# Patient Record
Sex: FEMALE | Race: WHITE | Employment: UNEMPLOYED | URBAN - METROPOLITAN AREA
[De-identification: names, ages, dates, MRNs, and addresses within clinical notes are randomized per-mention and may not be internally consistent; named-entity substitution may affect disease eponyms.]

---

## 2017-03-15 ENCOUNTER — OFFICE VISIT (OUTPATIENT)
Dept: FAMILY MEDICINE CLINIC | Facility: CLINIC | Age: 47
End: 2017-03-15

## 2017-03-15 VITALS
HEART RATE: 92 BPM | DIASTOLIC BLOOD PRESSURE: 78 MMHG | TEMPERATURE: 98 F | RESPIRATION RATE: 12 BRPM | SYSTOLIC BLOOD PRESSURE: 110 MMHG | WEIGHT: 115 LBS | HEIGHT: 60 IN | BODY MASS INDEX: 22.58 KG/M2 | OXYGEN SATURATION: 98 %

## 2017-03-15 DIAGNOSIS — J01.00 ACUTE NON-RECURRENT MAXILLARY SINUSITIS: Primary | ICD-10-CM

## 2017-03-15 DIAGNOSIS — J20.9 ACUTE BRONCHITIS, UNSPECIFIED ORGANISM: ICD-10-CM

## 2017-03-15 DIAGNOSIS — R07.89 CHEST TIGHTNESS: ICD-10-CM

## 2017-03-15 PROCEDURE — 99213 OFFICE O/P EST LOW 20 MIN: CPT | Performed by: PHYSICIAN ASSISTANT

## 2017-03-15 RX ORDER — ALBUTEROL SULFATE 90 UG/1
2 AEROSOL, METERED RESPIRATORY (INHALATION) EVERY 4 HOURS PRN
Qty: 1 INHALER | Refills: 0 | Status: SHIPPED | OUTPATIENT
Start: 2017-03-15 | End: 2018-09-18 | Stop reason: ALTCHOICE

## 2017-03-15 RX ORDER — CODEINE PHOSPHATE AND GUAIFENESIN 10; 100 MG/5ML; MG/5ML
5 SOLUTION ORAL EVERY 6 HOURS PRN
Qty: 120 ML | Refills: 0 | Status: SHIPPED | OUTPATIENT
Start: 2017-03-15 | End: 2017-08-14

## 2017-03-15 RX ORDER — LEVOFLOXACIN 500 MG/1
500 TABLET, FILM COATED ORAL DAILY
Qty: 7 TABLET | Refills: 0 | Status: SHIPPED | OUTPATIENT
Start: 2017-03-15 | End: 2017-03-22

## 2017-03-15 NOTE — PATIENT INSTRUCTIONS
Please follow up with your PCP if no improvement within 5-7 days. Go directly to the ER for any acute worsening of symptoms. No heavy lifting while taking Levaquin  · Rest.  Drink lots of fluids.   · Use the inhaler as needed for cough or wheeze  · Mucine susceptible to sunburn. Please wear sunscreen and apply often; also wear a hat. Take with food.   · Use back up birth control for pregnancy prevention for 1 month due to antibiotic use

## 2017-03-15 NOTE — PROGRESS NOTES
Patient presents with:  Cough: ST, chest congestion, sneezing, CP and SOB due to cough. Tan colored mucous off and on for about a month     HPI:   Jennifer Rasmussen is a 55year old female who presents for sinus congestion and cough on and off for  1  months. No exudates. NECK: supple, non-tender  LUNGS: Normal respiratory rate. Normal effort. Dry cough. No wheezing. No rhonchi, rales,or crackles. + moderately decreased BS to BLADE.    CARDIO: RRR without murmur  EXTREMITIES: no cyanosis, clubbing or edema  LYM within 48-72 hours  · Go to ER if facial or periorbital swelling develops  · Please take all of your antibiotic as prescribed or the infection will be treated ineffectively and may return  · Humidify the air.   Steam inhalation and warm compresses often hel

## 2017-04-07 ENCOUNTER — TELEPHONE (OUTPATIENT)
Dept: FAMILY MEDICINE CLINIC | Facility: CLINIC | Age: 47
End: 2017-04-07

## 2017-05-08 ENCOUNTER — OFFICE VISIT (OUTPATIENT)
Dept: FAMILY MEDICINE CLINIC | Facility: CLINIC | Age: 47
End: 2017-05-08

## 2017-05-08 VITALS
DIASTOLIC BLOOD PRESSURE: 60 MMHG | RESPIRATION RATE: 18 BRPM | TEMPERATURE: 98 F | SYSTOLIC BLOOD PRESSURE: 110 MMHG | HEART RATE: 78 BPM | OXYGEN SATURATION: 98 %

## 2017-05-08 DIAGNOSIS — M54.6 ACUTE LEFT-SIDED THORACIC BACK PAIN: Primary | ICD-10-CM

## 2017-05-08 DIAGNOSIS — J01.00 ACUTE MAXILLARY SINUSITIS, RECURRENCE NOT SPECIFIED: ICD-10-CM

## 2017-05-08 PROCEDURE — 81003 URINALYSIS AUTO W/O SCOPE: CPT | Performed by: NURSE PRACTITIONER

## 2017-05-08 PROCEDURE — 99213 OFFICE O/P EST LOW 20 MIN: CPT | Performed by: NURSE PRACTITIONER

## 2017-05-08 PROCEDURE — 87086 URINE CULTURE/COLONY COUNT: CPT | Performed by: NURSE PRACTITIONER

## 2017-05-08 RX ORDER — AMOXICILLIN AND CLAVULANATE POTASSIUM 875; 125 MG/1; MG/1
1 TABLET, FILM COATED ORAL 2 TIMES DAILY
Qty: 20 TABLET | Refills: 0 | Status: SHIPPED | OUTPATIENT
Start: 2017-05-08 | End: 2017-05-18

## 2017-05-08 NOTE — PROGRESS NOTES
CHIEF COMPLAINT:   Patient presents with:  URI: x1 week, but on and off for months      HPI:   Umer Ragsdale is a 55year old female who presents for sinus congestion for and and off for several months.   Reports last time here given levaquin, took that and Diagnosis Date   • Fibromyalgia    • Migraines           Past Surgical History    CHOLECYSTECTOMY      APPENDECTOMY      OTHER SURGICAL HISTORY      Comment lysis of adhesions x 3     HYSTERECTOMY      Comment Partial      Family History   Problem Relation ABD/Back:.  Mild pain with palpation to left flank area/CVAT. No abd tenderness. No bladder distention or pressure. EXTREMITIES: no cyanosis, clubbing or edema  LYMPH:  No cervical lymphadenopathy.         ASSESSMENT AND PLAN:     Bijan Mcintosh is a 55 ye The sinuses are air-filled spaces within the bones of the face. They connect to the inside of the nose. Sinusitis is an inflammation of the tissue lining the sinus cavity.  Sinus inflammation can occur during a cold. It can also be due to allergies to polle · Use acetaminophen or ibuprofen to control pain, unless another pain medicine was prescribed. (If you have chronic liver or kidney disease or ever had a stomach ulcer, talk with your doctor before using these medicines.  Aspirin should never be used in any

## 2017-05-10 ENCOUNTER — OFFICE VISIT (OUTPATIENT)
Dept: FAMILY MEDICINE CLINIC | Facility: CLINIC | Age: 47
End: 2017-05-10

## 2017-05-10 VITALS
OXYGEN SATURATION: 98 % | HEART RATE: 104 BPM | BODY MASS INDEX: 22 KG/M2 | WEIGHT: 115 LBS | RESPIRATION RATE: 14 BRPM | DIASTOLIC BLOOD PRESSURE: 80 MMHG | SYSTOLIC BLOOD PRESSURE: 120 MMHG | TEMPERATURE: 98 F

## 2017-05-10 DIAGNOSIS — R21 RASH: Primary | ICD-10-CM

## 2017-05-10 PROCEDURE — 87147 CULTURE TYPE IMMUNOLOGIC: CPT | Performed by: PHYSICIAN ASSISTANT

## 2017-05-10 PROCEDURE — 87880 STREP A ASSAY W/OPTIC: CPT | Performed by: PHYSICIAN ASSISTANT

## 2017-05-10 PROCEDURE — 87081 CULTURE SCREEN ONLY: CPT | Performed by: PHYSICIAN ASSISTANT

## 2017-05-10 PROCEDURE — 99213 OFFICE O/P EST LOW 20 MIN: CPT | Performed by: PHYSICIAN ASSISTANT

## 2017-05-10 RX ORDER — PREDNISONE 20 MG/1
40 TABLET ORAL DAILY
Qty: 10 TABLET | Refills: 0 | Status: SHIPPED | OUTPATIENT
Start: 2017-05-10 | End: 2017-05-15

## 2017-05-10 NOTE — PROGRESS NOTES
CHIEF COMPLAINT:   Patient presents with:  Rash     HPI:   Moni Stockton is a 55year old female who presents for evaluation of a rash. Per patient rash started last night. Rash has been worsening since onset.   Patient has not had similar rash in the past Diagnosis Date   • Fibromyalgia    • Migraines           Past Surgical History    CHOLECYSTECTOMY      APPENDECTOMY      OTHER SURGICAL HISTORY      Comment lysis of adhesions x 3     HYSTERECTOMY      Comment Partial      Family History   Problem Relati No erythema of the throat. Tonsils 1+/4 bilaterally and without exudate. Oropharynx moist without lesions. NECK:  Supple. Non tender  LUNGS: Clear to auscultation bilaterally. No wheezing, rhonchi, or rales. No diminished breath sounds.  No increased wor hours. If positive, then we will call in an appropriate antibiotic. If negative, then the sore throat is most likely viral in origin and should resolve within 7-10 days. Take cool baths as needed for itching.   Oatmeal baths (Aveeno bath) and Calamine lot

## 2017-05-10 NOTE — PATIENT INSTRUCTIONS
We will send out a throat culture and will contact you with the results in 48-72 hours. If positive, then we will call in an appropriate antibiotic. If negative, then the sore throat is most likely viral in origin and should resolve within 7-10 days.    [facility]

## 2017-05-13 ENCOUNTER — TELEPHONE (OUTPATIENT)
Dept: FAMILY MEDICINE CLINIC | Facility: CLINIC | Age: 47
End: 2017-05-13

## 2017-05-16 ENCOUNTER — TELEPHONE (OUTPATIENT)
Dept: FAMILY MEDICINE CLINIC | Facility: CLINIC | Age: 47
End: 2017-05-16

## 2017-06-19 ENCOUNTER — TELEPHONE (OUTPATIENT)
Dept: FAMILY MEDICINE CLINIC | Facility: CLINIC | Age: 47
End: 2017-06-19

## 2017-08-14 ENCOUNTER — HOSPITAL ENCOUNTER (OUTPATIENT)
Age: 47
Discharge: HOME | End: 2017-08-14
Attending: EMERGENCY MEDICINE

## 2017-08-14 ENCOUNTER — APPOINTMENT (OUTPATIENT)
Dept: CT IMAGING | Age: 47
End: 2017-08-14
Attending: EMERGENCY MEDICINE

## 2017-08-14 ENCOUNTER — APPOINTMENT (OUTPATIENT)
Dept: GENERAL RADIOLOGY | Age: 47
End: 2017-08-14
Attending: EMERGENCY MEDICINE

## 2017-08-14 VITALS
DIASTOLIC BLOOD PRESSURE: 76 MMHG | RESPIRATION RATE: 16 BRPM | HEART RATE: 87 BPM | TEMPERATURE: 98 F | WEIGHT: 119 LBS | OXYGEN SATURATION: 98 % | BODY MASS INDEX: 23 KG/M2 | SYSTOLIC BLOOD PRESSURE: 107 MMHG

## 2017-08-14 DIAGNOSIS — R05.9 COUGH: Primary | ICD-10-CM

## 2017-08-14 DIAGNOSIS — N83.209 OVARIAN CYST: ICD-10-CM

## 2017-08-14 DIAGNOSIS — R10.9 FLANK PAIN: ICD-10-CM

## 2017-08-14 DIAGNOSIS — R09.81 NASAL CONGESTION: ICD-10-CM

## 2017-08-14 LAB
#LYMPHOCYTE IC: 3.2 X10ˆ3/UL (ref 0.9–3.2)
#MXD IC: 1.3 X10ˆ3/UL (ref 0.1–1)
#NEUTROPHIL IC: 7.8 X10ˆ3/UL (ref 1.3–6.7)
CREAT SERPL-MCNC: 1 MG/DL (ref 0.4–1)
GLUCOSE BLD-MCNC: 55 MG/DL (ref 65–99)
HCT IC: 42.7 % (ref 37–54)
HGB IC: 14.9 G/DL (ref 11.7–16)
ISTAT BLOOD GAS TCO2: 25 MMOL/L (ref 22–32)
ISTAT BUN: 15 MG/DL (ref 8–20)
ISTAT CHLORIDE: 103 MMOL/L (ref 101–111)
ISTAT HEMATOCRIT: 44 % (ref 37–54)
ISTAT IONIZED CALCIUM: 1.14 MMOL/L (ref 1.12–1.32)
ISTAT POTASSIUM: 3.9 MMOL/L (ref 3.6–5.1)
ISTAT SODIUM: 141 MMOL/L (ref 136–144)
ISTAT TROPONIN: <0.1 NG/ML (ref ?–0.1)
LYMPHOCYTES NFR BLD AUTO: 26.1 %
MCH IC: 30 PG (ref 27–33.2)
MCHC IC: 34.9 G/DL (ref 31–37)
MCV IC: 85.9 FL (ref 81–100)
MIXED CELL %: 10.3 %
NEUTROPHILS NFR BLD AUTO: 63.6 %
PLT IC: 219 X10ˆ3/UL (ref 150–450)
POCT BILIRUBIN URINE: NEGATIVE
POCT BLOOD URINE: NEGATIVE
POCT GLUCOSE URINE: NEGATIVE MG/DL
POCT KETONE URINE: NEGATIVE MG/DL
POCT LEUKOCYTE ESTERASE URINE: NEGATIVE
POCT NITRITE URINE: NEGATIVE
POCT PH URINE: 7 (ref 5–8)
POCT SPECIFIC GRAVITY URINE: 1.02
POCT UROBILINOGEN URINE: 2 MG/DL
RBC IC: 4.97 X10ˆ6/UL (ref 3.8–5.1)
WBC IC: 12.3 X10ˆ3/UL (ref 4–13)

## 2017-08-14 PROCEDURE — 85025 COMPLETE CBC W/AUTO DIFF WBC: CPT | Performed by: EMERGENCY MEDICINE

## 2017-08-14 PROCEDURE — 81002 URINALYSIS NONAUTO W/O SCOPE: CPT | Performed by: EMERGENCY MEDICINE

## 2017-08-14 PROCEDURE — 99215 OFFICE O/P EST HI 40 MIN: CPT

## 2017-08-14 PROCEDURE — 93005 ELECTROCARDIOGRAM TRACING: CPT

## 2017-08-14 PROCEDURE — 93010 ELECTROCARDIOGRAM REPORT: CPT

## 2017-08-14 PROCEDURE — 87086 URINE CULTURE/COLONY COUNT: CPT | Performed by: EMERGENCY MEDICINE

## 2017-08-14 PROCEDURE — 84484 ASSAY OF TROPONIN QUANT: CPT

## 2017-08-14 PROCEDURE — 80047 BASIC METABLC PNL IONIZED CA: CPT

## 2017-08-14 PROCEDURE — 71020 XR CHEST PA + LAT CHEST (CPT=71020): CPT | Performed by: EMERGENCY MEDICINE

## 2017-08-14 PROCEDURE — 74176 CT ABD & PELVIS W/O CONTRAST: CPT | Performed by: EMERGENCY MEDICINE

## 2017-08-14 RX ORDER — ALBUTEROL SULFATE 90 UG/1
2 AEROSOL, METERED RESPIRATORY (INHALATION) EVERY 4 HOURS PRN
Qty: 1 INHALER | Refills: 0 | Status: SHIPPED | OUTPATIENT
Start: 2017-08-14 | End: 2017-09-13

## 2017-08-14 RX ORDER — PREDNISONE 20 MG/1
40 TABLET ORAL DAILY
Qty: 10 TABLET | Refills: 0 | Status: SHIPPED | OUTPATIENT
Start: 2017-08-14 | End: 2017-08-19

## 2017-08-14 RX ORDER — CEFDINIR 300 MG/1
300 CAPSULE ORAL 2 TIMES DAILY
Qty: 20 CAPSULE | Refills: 0 | Status: SHIPPED | OUTPATIENT
Start: 2017-08-14 | End: 2017-08-24

## 2017-08-14 NOTE — ED NOTES
Pt back from Xray and CT with Tech, repositioned for comfort, denies any needs, pain 5/10. Labs obtained.

## 2017-08-14 NOTE — ED INITIAL ASSESSMENT (HPI)
X2 MONTHS Pt c/o cough and congestion, Saturday Pt started with a fever (Tmax 99.9)    Saturday Pt c/o freq with urination, +cinthia flank pain.

## 2017-08-15 LAB
ATRIAL RATE: 75 BPM
P AXIS: 60 DEGREES
P-R INTERVAL: 118 MS
Q-T INTERVAL: 394 MS
QRS DURATION: 78 MS
QTC CALCULATION (BEZET): 439 MS
R AXIS: 46 DEGREES
T AXIS: 47 DEGREES
VENTRICULAR RATE: 75 BPM

## 2017-08-15 NOTE — ED PROVIDER NOTES
Patient presents with:  Cough/URI  Fever (infectious)  Urinary Symptoms (urologic)    HPI:     Delvin Jamison is a 55year old female who presents with chief complaint of cough, congestion, flank pain.   Pt states she has a hx of fibromyalgia and frequent py rashes, lesions or abrasions. Diagnostics:     EKG Interpretation    Rate, axis and intervals noted. Rate:  75  Rhythm: Normal sinus rhythm  No acute changes noted.      Labs Reviewed   POCT URINALYSIS DIPSTICK - Abnormal; Notable for the following: information is transmitted to the A[facility] ([facility]) NRDR ([address]) which includes the Dose Index Registry.   PATIENT STATED HISTORY: (As transcribed by Technologist)  Constant bilateral posterior upper abdominal pa NSAID/APAP, antihistamine, flonase  3. F/u with PCP in 3-4 days for re-evaluation, sooner if symptoms worsen      All results reviewed and discussed with patient. See AVS for detailed discharge instructions.

## 2017-08-21 ENCOUNTER — OFFICE VISIT (OUTPATIENT)
Dept: FAMILY MEDICINE CLINIC | Facility: CLINIC | Age: 47
End: 2017-08-21

## 2017-08-21 ENCOUNTER — TELEPHONE (OUTPATIENT)
Dept: FAMILY MEDICINE CLINIC | Facility: CLINIC | Age: 47
End: 2017-08-21

## 2017-08-21 VITALS
TEMPERATURE: 98 F | BODY MASS INDEX: 24 KG/M2 | SYSTOLIC BLOOD PRESSURE: 108 MMHG | WEIGHT: 123 LBS | RESPIRATION RATE: 22 BRPM | DIASTOLIC BLOOD PRESSURE: 72 MMHG | HEART RATE: 88 BPM

## 2017-08-21 DIAGNOSIS — N83.201 OVARIAN CYST, RIGHT: Primary | ICD-10-CM

## 2017-08-21 DIAGNOSIS — K66.0 INTRA-ABDOMINAL ADHESIONS: ICD-10-CM

## 2017-08-21 DIAGNOSIS — R10.2 PELVIC PAIN: ICD-10-CM

## 2017-08-21 PROCEDURE — 99214 OFFICE O/P EST MOD 30 MIN: CPT | Performed by: FAMILY MEDICINE

## 2017-08-21 RX ORDER — CEFUROXIME AXETIL 250 MG/1
TABLET ORAL
Refills: 0 | COMMUNITY
Start: 2017-08-14 | End: 2017-09-21 | Stop reason: ALTCHOICE

## 2017-08-21 RX ORDER — DEXTROAMPHETAMINE SACCHARATE, AMPHETAMINE ASPARTATE MONOHYDRATE, DEXTROAMPHETAMINE SULFATE AND AMPHETAMINE SULFATE 7.5; 7.5; 7.5; 7.5 MG/1; MG/1; MG/1; MG/1
30 CAPSULE, EXTENDED RELEASE ORAL EVERY MORNING
COMMUNITY
End: 2018-08-29

## 2017-08-21 NOTE — PROGRESS NOTES
Mara Gonsales is a 55year old female.   HPI:   Phan Tam is here for follow up on her recent IC and ER visit, over the weekend, she was diagnosed with a right ovarian cyst, and had a CT and US that showed it is only a cyst. She has  Had a hysterectomy, and had unusual skin lesions or rashes  RESPIRATORY: denies shortness of breath with exertion  CARDIOVASCULAR: denies chest pain on exertion  GI: RLQ abdominal pain and denies heartburn, Hx of Multiple surgical procedures, and adhesions  NEURO: denies headaches

## 2017-08-21 NOTE — TELEPHONE ENCOUNTER
JAZMYNE pt-Pt states she was seen about a week ago at [facility]-dx-ovarian cyst.  Pt had pain yesterday and went to [facility]. Dx same. Pt states she had an MRI done previously and an ultra sound.  Pt coming in this afternoon at 1:00 f

## 2017-08-25 ENCOUNTER — HOSPITAL ENCOUNTER (OUTPATIENT)
Age: 47
Discharge: HOME | End: 2017-08-25
Attending: EMERGENCY MEDICINE
Payer: MEDICAID

## 2017-08-25 ENCOUNTER — APPOINTMENT (OUTPATIENT)
Dept: GENERAL RADIOLOGY | Age: 47
End: 2017-08-25
Attending: FAMILY MEDICINE
Payer: MEDICAID

## 2017-08-25 ENCOUNTER — TELEPHONE (OUTPATIENT)
Dept: FAMILY MEDICINE CLINIC | Facility: CLINIC | Age: 47
End: 2017-08-25

## 2017-08-25 VITALS
HEIGHT: 60 IN | OXYGEN SATURATION: 96 % | DIASTOLIC BLOOD PRESSURE: 63 MMHG | RESPIRATION RATE: 16 BRPM | HEART RATE: 82 BPM | WEIGHT: 126 LBS | SYSTOLIC BLOOD PRESSURE: 112 MMHG | TEMPERATURE: 98 F | BODY MASS INDEX: 24.74 KG/M2

## 2017-08-25 DIAGNOSIS — K59.03 DRUG-INDUCED CONSTIPATION: Primary | ICD-10-CM

## 2017-08-25 PROCEDURE — 99214 OFFICE O/P EST MOD 30 MIN: CPT

## 2017-08-25 PROCEDURE — 74020 XR ABDOMEN, OBSTRUCTIVE SERIES (CPT=74020): CPT | Performed by: FAMILY MEDICINE

## 2017-08-25 PROCEDURE — 99213 OFFICE O/P EST LOW 20 MIN: CPT

## 2017-08-25 RX ORDER — LACTULOSE 20 G/30ML
20 SOLUTION ORAL 2 TIMES DAILY
Qty: 300 ML | Refills: 0 | Status: SHIPPED | OUTPATIENT
Start: 2017-08-25 | End: 2017-08-30

## 2017-08-25 RX ORDER — ONDANSETRON 4 MG/1
4 TABLET, ORALLY DISINTEGRATING ORAL EVERY 8 HOURS PRN
COMMUNITY
End: 2018-09-18 | Stop reason: ALTCHOICE

## 2017-08-25 RX ORDER — DOCUSATE SODIUM 100 MG/1
100 CAPSULE, LIQUID FILLED ORAL 2 TIMES DAILY
Qty: 60 CAPSULE | Refills: 0 | Status: SHIPPED | OUTPATIENT
Start: 2017-08-25 | End: 2017-09-24

## 2017-08-25 RX ORDER — HYDROCODONE BITARTRATE AND ACETAMINOPHEN 5; 325 MG/1; MG/1
1 TABLET ORAL EVERY 6 HOURS PRN
COMMUNITY
End: 2018-09-18 | Stop reason: ALTCHOICE

## 2017-08-25 NOTE — TELEPHONE ENCOUNTER
Dr Felipe Lennon notified of patient message. States patient should go to the IC for evaluation. May need more imaging. Patient notified and verbalized understanding.   Patient confirmed she knows where Rosa Bah is in [facility]

## 2017-08-25 NOTE — ED PROVIDER NOTES
Patient Seen in: [address] [facility]    History   Patient presents with:  Abdominal Pain  Constipation  Nausea    Stated Complaint: low ab pain    HPI    Patient with past medical history significant for fibromyalgia, ovarian cyst, appendecto HFA) 108 (90 Base) MCG/ACT Inhalation Aero Soln,  Inhale 2 puffs into the lungs every 4 (four) hours as needed for Wheezing.    SUMAtriptan Succinate 100 MG Oral Tab,  Take 1 tablet at onset of migraine; repeat once after 2 HRS-ONLY 2 IN 24 HR MAX       Fam and stool throughout the colon and air in the rectum.  ============================================================  ED Course  ------------------------------------------------------------  MDM           Disposition and Plan     Clinical Impression:  Drug-

## 2017-08-25 NOTE — TELEPHONE ENCOUNTER
PT WAS SEEN ON 8/21 WITH DR MARADIAGA-PT ADV NOT ABLE TO HAVE BM-PT HAS CYST ON OVARY AND IS IS A LOT OF PAIN AND NOT FEELING ANY BETTER.     PT WONDERING IF SHE NEEDS TO BE SEEN AGAIN OR OTHER RECOMMENDATIONS    THANK YOU

## 2017-08-25 NOTE — ED INITIAL ASSESSMENT (HPI)
Patient states she was diagnosed with an ovarian cyst approximately 2 weeks ago, here at [facility]. Followed up with Dr Marty Hodge and was also seen at [facility] ED on Sunday. Had a trans vag US, UA and blood work there.   C/O no relief of right lower abd pain and

## 2017-08-28 ENCOUNTER — TELEPHONE (OUTPATIENT)
Dept: FAMILY MEDICINE CLINIC | Facility: CLINIC | Age: 47
End: 2017-08-28

## 2017-08-28 NOTE — TELEPHONE ENCOUNTER
Pt called back to let us know she gave the wrong number for a fax number. The number she gave us is the telephone number. The fax number is [phone], please re-fax.

## 2017-08-28 NOTE — TELEPHONE ENCOUNTER
Patient said she was in the office last week for an ER follow up. She did not get a note excusing her from work that day so she needs one sent to her work now. Their fax is [phone].

## 2017-08-28 NOTE — TELEPHONE ENCOUNTER
Spoke with patient and confirmed fax number.   Faxed to [facility] at [phone]    Note sent to scanning

## 2017-09-05 ENCOUNTER — PATIENT OUTREACH (OUTPATIENT)
Dept: FAMILY MEDICINE CLINIC | Facility: CLINIC | Age: 47
End: 2017-09-05

## 2017-09-05 NOTE — PROGRESS NOTES
Callisamantha Ruiz is due for mammogram.   Last mammogram date:  None in Epic. Mychart/Letter sent to patient.

## 2017-09-19 DIAGNOSIS — G43.119 INTRACTABLE MIGRAINE WITH AURA WITHOUT STATUS MIGRAINOSUS: ICD-10-CM

## 2017-09-19 RX ORDER — SUMATRIPTAN 100 MG/1
TABLET, FILM COATED ORAL
Qty: 9 TABLET | Refills: 0 | Status: SHIPPED | OUTPATIENT
Start: 2017-09-19 | End: 2017-09-21

## 2017-09-19 RX ORDER — CYCLOBENZAPRINE HCL 10 MG
TABLET ORAL
Qty: 10 TABLET | Refills: 0 | Status: SHIPPED | OUTPATIENT
Start: 2017-09-19 | End: 2017-10-16

## 2017-09-19 NOTE — TELEPHONE ENCOUNTER
Patient notified and appt scheduled      Future Appointments  Date Time Provider Aisha Lantigua   9/21/2017 2:30 PM Spencer Molina [facility] ALEX Tsang

## 2017-09-19 NOTE — TELEPHONE ENCOUNTER
States she is having a lot of fibromyalgia pain and a migraine for the past 2 days.   Would like to know if Dr Basilio Hudson can refill her flexeril as well

## 2017-09-19 NOTE — TELEPHONE ENCOUNTER
I haven't seen her in over a year. I can give her a few tablets, but she needs to see me for follow up before any other meds are prescribed.

## 2017-09-21 ENCOUNTER — OFFICE VISIT (OUTPATIENT)
Dept: FAMILY MEDICINE CLINIC | Facility: CLINIC | Age: 47
End: 2017-09-21

## 2017-09-21 VITALS
WEIGHT: 120.81 LBS | RESPIRATION RATE: 16 BRPM | BODY MASS INDEX: 24 KG/M2 | TEMPERATURE: 99 F | HEART RATE: 96 BPM | SYSTOLIC BLOOD PRESSURE: 122 MMHG | DIASTOLIC BLOOD PRESSURE: 72 MMHG

## 2017-09-21 DIAGNOSIS — R10.2 PELVIC PAIN: ICD-10-CM

## 2017-09-21 DIAGNOSIS — F98.8 ATTENTION DEFICIT DISORDER (ADD) WITHOUT HYPERACTIVITY: ICD-10-CM

## 2017-09-21 DIAGNOSIS — M79.7 FIBROMYALGIA: ICD-10-CM

## 2017-09-21 DIAGNOSIS — G43.109 MIGRAINE WITH AURA AND WITHOUT STATUS MIGRAINOSUS, NOT INTRACTABLE: Primary | ICD-10-CM

## 2017-09-21 DIAGNOSIS — F41.9 ANXIETY: ICD-10-CM

## 2017-09-21 DIAGNOSIS — G43.119 INTRACTABLE MIGRAINE WITH AURA WITHOUT STATUS MIGRAINOSUS: ICD-10-CM

## 2017-09-21 DIAGNOSIS — N83.201 OVARIAN CYST, RIGHT: ICD-10-CM

## 2017-09-21 DIAGNOSIS — F32.A DEPRESSION, UNSPECIFIED DEPRESSION TYPE: ICD-10-CM

## 2017-09-21 PROCEDURE — 99214 OFFICE O/P EST MOD 30 MIN: CPT | Performed by: FAMILY MEDICINE

## 2017-09-21 RX ORDER — DEXTROAMPHETAMINE SACCHARATE, AMPHETAMINE ASPARTATE, DEXTROAMPHETAMINE SULFATE AND AMPHETAMINE SULFATE 7.5; 7.5; 7.5; 7.5 MG/1; MG/1; MG/1; MG/1
30 TABLET ORAL DAILY
Qty: 30 TABLET | Refills: 0 | Status: SHIPPED | OUTPATIENT
Start: 2017-09-21 | End: 2017-10-20

## 2017-09-21 RX ORDER — ESCITALOPRAM OXALATE 5 MG/1
5 TABLET ORAL DAILY
Qty: 30 TABLET | Refills: 1 | Status: SHIPPED | OUTPATIENT
Start: 2017-09-21 | End: 2017-09-21

## 2017-09-21 RX ORDER — SUMATRIPTAN 100 MG/1
TABLET, FILM COATED ORAL
Qty: 9 TABLET | Refills: 0 | Status: SHIPPED | OUTPATIENT
Start: 2017-09-21 | End: 2018-01-26

## 2017-09-21 NOTE — PROGRESS NOTES
Blaine Benavides is a 55year old female. Patient presents with: Follow - Up: per pt, follow up on pain      HPI:   Fibromyalgia: missing days at work for fibro and migraines again. Using flexeril as needed.  She has been on many other meds for fibro in the puffs into the lungs every 4 (four) hours as needed for Wheezing. Disp: 1 Inhaler Rfl: 0   ondansetron 4 MG Oral Tablet Dispersible Take 4 mg by mouth every 8 (eight) hours as needed for Nausea.  Disp:  Rfl:    HYDROcodone-acetaminophen 5-325 MG Oral Tab Ta normocephalic,ears and throat are clear  NECK: supple,no adenopathy  LUNGS: clear to auscultation  CARDIO: RRR without murmur  GI: good BS's,no masses, HSM or tenderness  EXTREMITIES: no cyanosis, clubbing or edema  Psych: depressed, tearful     ASSESSMENT indicates understanding of these issues and agrees to the plan.

## 2017-09-22 RX ORDER — ESCITALOPRAM OXALATE 5 MG/1
TABLET ORAL
Qty: 90 TABLET | Refills: 0 | Status: SHIPPED | OUTPATIENT
Start: 2017-09-22 | End: 2017-11-20

## 2017-09-25 ENCOUNTER — TELEPHONE (OUTPATIENT)
Dept: FAMILY MEDICINE CLINIC | Facility: CLINIC | Age: 47
End: 2017-09-25

## 2017-09-25 NOTE — TELEPHONE ENCOUNTER
Patient notified FMLA forms ready for . Patient requests form faxed to sfilatino at [employer] at [phone]. Form faxed to  and original placed in patient  folder. Copy sent to scanning.

## 2017-10-16 RX ORDER — CYCLOBENZAPRINE HCL 10 MG
TABLET ORAL
Qty: 15 TABLET | Refills: 0 | Status: SHIPPED | OUTPATIENT
Start: 2017-10-16 | End: 2018-01-26

## 2017-10-20 ENCOUNTER — TELEPHONE (OUTPATIENT)
Dept: FAMILY MEDICINE CLINIC | Facility: CLINIC | Age: 47
End: 2017-10-20

## 2017-10-20 DIAGNOSIS — F98.8 ATTENTION DEFICIT DISORDER (ADD) WITHOUT HYPERACTIVITY: ICD-10-CM

## 2017-10-20 RX ORDER — DEXTROAMPHETAMINE SACCHARATE, AMPHETAMINE ASPARTATE, DEXTROAMPHETAMINE SULFATE AND AMPHETAMINE SULFATE 7.5; 7.5; 7.5; 7.5 MG/1; MG/1; MG/1; MG/1
30 TABLET ORAL DAILY
Qty: 30 TABLET | Refills: 0 | Status: SHIPPED | OUTPATIENT
Start: 2017-10-20 | End: 2017-11-20

## 2017-11-17 RX ORDER — ESCITALOPRAM OXALATE 5 MG/1
TABLET ORAL
Qty: 30 TABLET | Refills: 0 | OUTPATIENT
Start: 2017-11-17

## 2017-11-20 ENCOUNTER — TELEPHONE (OUTPATIENT)
Dept: FAMILY MEDICINE CLINIC | Facility: CLINIC | Age: 47
End: 2017-11-20

## 2017-11-20 DIAGNOSIS — F98.8 ATTENTION DEFICIT DISORDER (ADD) WITHOUT HYPERACTIVITY: ICD-10-CM

## 2017-11-20 RX ORDER — ESCITALOPRAM OXALATE 10 MG/1
10 TABLET ORAL DAILY
Qty: 90 TABLET | Refills: 0 | Status: SHIPPED | OUTPATIENT
Start: 2017-11-20 | End: 2018-09-18 | Stop reason: ALTCHOICE

## 2017-11-20 RX ORDER — DEXTROAMPHETAMINE SACCHARATE, AMPHETAMINE ASPARTATE, DEXTROAMPHETAMINE SULFATE AND AMPHETAMINE SULFATE 7.5; 7.5; 7.5; 7.5 MG/1; MG/1; MG/1; MG/1
30 TABLET ORAL DAILY
Qty: 30 TABLET | Refills: 0 | Status: SHIPPED | OUTPATIENT
Start: 2017-11-20 | End: 2018-01-26

## 2017-11-20 NOTE — TELEPHONE ENCOUNTER
Patient notified and verbalized understanding. F/u scheduled    Patient states she can not get in to see Dr Patricia Haile until Dec 27. Says if the pain gets bad she guesses she will got to the UC.   Advised patient she could see any provider in that office if t

## 2017-11-20 NOTE — TELEPHONE ENCOUNTER
Patient states she needs a refill of lexapro but is not sure the dose is right for her . States she has not noticed any big difference since taking. Does not feel it is helping.       Number for Dr Iban Iraheta provided to patient

## 2017-11-20 NOTE — TELEPHONE ENCOUNTER
Pt called, would like to discuss Lexipro and also needs the name of the Gyn that we recommended she go to.    Please call pt at [phone]

## 2017-11-20 NOTE — TELEPHONE ENCOUNTER
Lets bump her up to 10 mg of lexapro and see if that helps anymore. Script sent in. Follow up with me in 6 weeks.

## 2017-11-22 ENCOUNTER — TELEPHONE (OUTPATIENT)
Dept: FAMILY MEDICINE CLINIC | Facility: CLINIC | Age: 47
End: 2017-11-22

## 2017-11-22 NOTE — TELEPHONE ENCOUNTER
Pt was in the office to  a script and she is in pain and crying and would like one of the covering docs to address her pain.     I asked her where her pain is and she states that her last ultrasound showed that she had an ovarian cyst and that she ha

## 2018-01-04 ENCOUNTER — TELEPHONE (OUTPATIENT)
Dept: FAMILY MEDICINE CLINIC | Facility: CLINIC | Age: 48
End: 2018-01-04

## 2018-01-04 NOTE — TELEPHONE ENCOUNTER
Tried to call pt to inform her she missed an appt to date. This is First no show,   Pt's phone number is not working at this time. No way to get a hold of her. No charge for no show today.

## 2018-01-26 DIAGNOSIS — F98.8 ATTENTION DEFICIT DISORDER (ADD) WITHOUT HYPERACTIVITY: ICD-10-CM

## 2018-01-26 DIAGNOSIS — G43.119 INTRACTABLE MIGRAINE WITH AURA WITHOUT STATUS MIGRAINOSUS: ICD-10-CM

## 2018-01-26 RX ORDER — DEXTROAMPHETAMINE SACCHARATE, AMPHETAMINE ASPARTATE, DEXTROAMPHETAMINE SULFATE AND AMPHETAMINE SULFATE 7.5; 7.5; 7.5; 7.5 MG/1; MG/1; MG/1; MG/1
30 TABLET ORAL DAILY
Qty: 30 TABLET | Refills: 0 | Status: SHIPPED | OUTPATIENT
Start: 2018-01-26 | End: 2018-06-14

## 2018-01-26 RX ORDER — SUMATRIPTAN 100 MG/1
TABLET, FILM COATED ORAL
Qty: 9 TABLET | Refills: 0 | Status: SHIPPED | OUTPATIENT
Start: 2018-01-26 | End: 2018-06-14

## 2018-01-26 RX ORDER — CYCLOBENZAPRINE HCL 10 MG
TABLET ORAL
Qty: 15 TABLET | Refills: 0 | Status: SHIPPED | OUTPATIENT
Start: 2018-01-26 | End: 2018-06-14

## 2018-01-26 NOTE — TELEPHONE ENCOUNTER
Last OV 9/21/17  Last labs 5-  Last refilled:  11/20/17 Adderall  #30  0 refills  10/16/17 Cyclobenzaprine  #15  0 refills  9/21/17  Sumatriptan  #9  0 refills

## 2018-01-26 NOTE — TELEPHONE ENCOUNTER
adderall pt will    Migraine meds and flexeril send to [facility]

## 2018-01-26 NOTE — TELEPHONE ENCOUNTER
Adderall script placed in blue rx book in closet. Left detailed message on voicemail for pt to  rx. Ok per consent. Advised to bring in photo ID when picking up rx. Forward to front office.

## 2018-01-31 ENCOUNTER — OFFICE VISIT (OUTPATIENT)
Dept: OBGYN CLINIC | Facility: CLINIC | Age: 48
End: 2018-01-31

## 2018-01-31 VITALS
SYSTOLIC BLOOD PRESSURE: 102 MMHG | BODY MASS INDEX: 22.48 KG/M2 | HEIGHT: 60.25 IN | DIASTOLIC BLOOD PRESSURE: 66 MMHG | WEIGHT: 116 LBS

## 2018-01-31 DIAGNOSIS — R30.0 DYSURIA: ICD-10-CM

## 2018-01-31 DIAGNOSIS — N83.201 RIGHT OVARIAN CYST: Primary | ICD-10-CM

## 2018-01-31 DIAGNOSIS — R10.2 PELVIC PAIN: ICD-10-CM

## 2018-01-31 DIAGNOSIS — Z87.42 HISTORY OF ENDOMETRIOSIS: ICD-10-CM

## 2018-01-31 LAB
APPEARANCE: CLEAR
BILIRUBIN: NEGATIVE
GLUCOSE (URINE DIPSTICK): NEGATIVE MG/DL
KETONES (URINE DIPSTICK): NEGATIVE MG/DL
LEUKOCYTES: NEGATIVE
MULTISTIX LOT#: NORMAL NUMERIC
NITRITE, URINE: NEGATIVE
PH, URINE: 6.5 (ref 4.5–8)
PROTEIN (URINE DIPSTICK): NEGATIVE MG/DL
SPECIFIC GRAVITY: 1.01 (ref 1–1.03)
URINE-COLOR: YELLOW
UROBILINOGEN,SEMI-QN: 0.2 MG/DL (ref 0–1.9)

## 2018-01-31 PROCEDURE — 99203 OFFICE O/P NEW LOW 30 MIN: CPT | Performed by: OBSTETRICS & GYNECOLOGY

## 2018-01-31 PROCEDURE — 87086 URINE CULTURE/COLONY COUNT: CPT | Performed by: OBSTETRICS & GYNECOLOGY

## 2018-01-31 PROCEDURE — 81002 URINALYSIS NONAUTO W/O SCOPE: CPT | Performed by: OBSTETRICS & GYNECOLOGY

## 2018-01-31 NOTE — PROGRESS NOTES
HPI:    Patient ID: Angeline Tobias is a 52year old female. Abdominal pain, stabbing, cramping right side. Since August. Daily, sometimes more tolerable. Takes motrin daily 800mg BID for fibromyalgia. 6 abd surgeries, 2 lysis of adhesions. No reg BM. (See                            Comments)    Comment:hallucinations   PHYSICAL EXAM:   Physical Exam   Constitutional: She appears well-developed and well-nourished. Abdominal: Soft. Bowel sounds are normal. She exhibits no mass.  There is no rebound and

## 2018-02-05 RX ORDER — NITROFURANTOIN 25; 75 MG/1; MG/1
100 CAPSULE ORAL 2 TIMES DAILY
Qty: 14 CAPSULE | Refills: 0 | Status: SHIPPED | OUTPATIENT
Start: 2018-02-05 | End: 2018-02-12

## 2018-02-27 ENCOUNTER — TELEPHONE (OUTPATIENT)
Dept: OBGYN CLINIC | Facility: CLINIC | Age: 48
End: 2018-02-27

## 2018-02-27 NOTE — TELEPHONE ENCOUNTER
Called to inform pt of missed/no show appointment and $25. Fee  Left message to call and reschedule / provided call back number

## 2018-06-14 DIAGNOSIS — G43.119 INTRACTABLE MIGRAINE WITH AURA WITHOUT STATUS MIGRAINOSUS: ICD-10-CM

## 2018-06-14 DIAGNOSIS — F98.8 ATTENTION DEFICIT DISORDER (ADD) WITHOUT HYPERACTIVITY: ICD-10-CM

## 2018-06-14 RX ORDER — DEXTROAMPHETAMINE SACCHARATE, AMPHETAMINE ASPARTATE, DEXTROAMPHETAMINE SULFATE AND AMPHETAMINE SULFATE 7.5; 7.5; 7.5; 7.5 MG/1; MG/1; MG/1; MG/1
30 TABLET ORAL DAILY
Qty: 30 TABLET | Refills: 0 | Status: SHIPPED | OUTPATIENT
Start: 2018-06-14 | End: 2018-08-30

## 2018-06-14 RX ORDER — CYCLOBENZAPRINE HCL 10 MG
TABLET ORAL
Qty: 15 TABLET | Refills: 0 | Status: SHIPPED | OUTPATIENT
Start: 2018-06-14 | End: 2018-08-29

## 2018-06-14 RX ORDER — SUMATRIPTAN 100 MG/1
TABLET, FILM COATED ORAL
Qty: 9 TABLET | Refills: 0 | Status: SHIPPED | OUTPATIENT
Start: 2018-06-14 | End: 2018-08-29

## 2018-06-14 NOTE — TELEPHONE ENCOUNTER
Last OV 9/21/17  Both meds last refilled 1/26/18    Patient states she uses the cyclobenzaprine to help with sleep when pain gets bad. Patient states she ran out adderall a few months back and would like a refill.   Helps when she takes it

## 2018-06-14 NOTE — TELEPHONE ENCOUNTER
Patient notified cyclobenzaprine sent to pharmacy. addreall ready for  at the office. Patient states she will be coming to  script.     Placed in  folder

## 2018-06-14 NOTE — TELEPHONE ENCOUNTER
PT CALLED AND ADV NEEDS REFILLS OF    amphetamine-dextroamphetamine (ADDERALL) 30 MG Oral Tab    AND    Cyclobenzaprine HCl 10 MG Oral Tab    PLEASE SEND TO [facility] [address]    PLEASE CALL WHEN ADDERALL SCRIPTS IS READY FOR P/U    THANK YOU

## 2018-08-29 DIAGNOSIS — G43.119 INTRACTABLE MIGRAINE WITH AURA WITHOUT STATUS MIGRAINOSUS: ICD-10-CM

## 2018-08-29 DIAGNOSIS — F98.8 ATTENTION DEFICIT DISORDER (ADD) WITHOUT HYPERACTIVITY: ICD-10-CM

## 2018-08-29 NOTE — TELEPHONE ENCOUNTER
LOV: 9/21/17  Last Refill:   Cyclobenzaprine 6/14/18 #15 0 RF  Sumatriptan  6/14/18  #9 0 RF  Adderall 6/14/18 #30 0 RF    No future appointments.

## 2018-08-29 NOTE — TELEPHONE ENCOUNTER
PT CALLED AND ADV SHE IS OUT OF ALL MEDS-ADV PT THAT DR OUT Justine Mckeon TOMORROW.      amphetamine-dextroamphetamine (ADDERALL) 30 MG Oral Tab    SUMAtriptan Succinate 100 MG Oral Tab    Cyclobenzaprine HCl 10 MG Oral Tab     PLEASE CALL WHEN READY FOR P/U AND PL

## 2018-08-30 ENCOUNTER — TELEPHONE (OUTPATIENT)
Dept: FAMILY MEDICINE CLINIC | Facility: CLINIC | Age: 48
End: 2018-08-30

## 2018-08-30 RX ORDER — DEXTROAMPHETAMINE SACCHARATE, AMPHETAMINE ASPARTATE, DEXTROAMPHETAMINE SULFATE AND AMPHETAMINE SULFATE 7.5; 7.5; 7.5; 7.5 MG/1; MG/1; MG/1; MG/1
30 TABLET ORAL DAILY
Qty: 30 TABLET | Refills: 0 | Status: SHIPPED | OUTPATIENT
Start: 2018-08-30 | End: 2018-10-15

## 2018-08-30 RX ORDER — SUMATRIPTAN 100 MG/1
TABLET, FILM COATED ORAL
Qty: 9 TABLET | Refills: 0 | Status: SHIPPED | OUTPATIENT
Start: 2018-08-30 | End: 2018-11-30

## 2018-08-30 RX ORDER — DEXTROAMPHETAMINE SACCHARATE, AMPHETAMINE ASPARTATE MONOHYDRATE, DEXTROAMPHETAMINE SULFATE AND AMPHETAMINE SULFATE 7.5; 7.5; 7.5; 7.5 MG/1; MG/1; MG/1; MG/1
30 CAPSULE, EXTENDED RELEASE ORAL EVERY MORNING
Qty: 30 CAPSULE | Refills: 0 | Status: SHIPPED | OUTPATIENT
Start: 2018-08-30 | End: 2018-08-30 | Stop reason: CLARIF

## 2018-08-30 RX ORDER — CYCLOBENZAPRINE HCL 10 MG
TABLET ORAL
Qty: 15 TABLET | Refills: 0 | Status: SHIPPED | OUTPATIENT
Start: 2018-08-30 | End: 2018-09-27

## 2018-08-30 NOTE — TELEPHONE ENCOUNTER
Pt called, she just picked up the script for ADDERALL and she says it is wrong. It is for ADDERALL ER caps 30 MG,  Pt takes 30 gm TABS of ADDERALL. Doron Castillo No ER, XR. Please call pt at [phone].    Pt is at the pharmacy now and would like a corrected scrip

## 2018-08-30 NOTE — TELEPHONE ENCOUNTER
Patient came to office and turned in old script and was replaced with script for immediate release.    [facility]

## 2018-09-18 ENCOUNTER — OFFICE VISIT (OUTPATIENT)
Dept: FAMILY MEDICINE CLINIC | Facility: CLINIC | Age: 48
End: 2018-09-18
Payer: MEDICAID

## 2018-09-18 VITALS
HEART RATE: 96 BPM | SYSTOLIC BLOOD PRESSURE: 110 MMHG | DIASTOLIC BLOOD PRESSURE: 68 MMHG | OXYGEN SATURATION: 98 % | RESPIRATION RATE: 16 BRPM | TEMPERATURE: 98 F

## 2018-09-18 DIAGNOSIS — Z72.0 TOBACCO USE: ICD-10-CM

## 2018-09-18 DIAGNOSIS — H65.191 OTHER ACUTE NONSUPPURATIVE OTITIS MEDIA OF RIGHT EAR, RECURRENCE NOT SPECIFIED: Primary | ICD-10-CM

## 2018-09-18 DIAGNOSIS — J30.2 SEASONAL ALLERGIC RHINITIS, UNSPECIFIED TRIGGER: ICD-10-CM

## 2018-09-18 PROCEDURE — 99213 OFFICE O/P EST LOW 20 MIN: CPT | Performed by: PHYSICIAN ASSISTANT

## 2018-09-18 RX ORDER — ALBUTEROL SULFATE 90 UG/1
2 AEROSOL, METERED RESPIRATORY (INHALATION) EVERY 4 HOURS PRN
Qty: 1 INHALER | Refills: 1 | Status: SHIPPED | OUTPATIENT
Start: 2018-09-18

## 2018-09-18 RX ORDER — BENZONATATE 200 MG/1
200 CAPSULE ORAL 3 TIMES DAILY PRN
Qty: 30 CAPSULE | Refills: 0 | Status: SHIPPED | OUTPATIENT
Start: 2018-09-18

## 2018-09-18 RX ORDER — AMOXICILLIN 875 MG/1
875 TABLET, COATED ORAL 2 TIMES DAILY
Qty: 20 TABLET | Refills: 0 | Status: SHIPPED | OUTPATIENT
Start: 2018-09-18 | End: 2018-09-28

## 2018-09-18 NOTE — PATIENT INSTRUCTIONS
1.  Amoxicillin 875 mg twice daily for 10 days. 2.  Flonase as prescribed. 2 sprays in each nostril daily, or 1 spray in each nostril twice daily.   If you develop a nosebleed, stop medication and restart at half the dose (1 spray in each nostril daily) a

## 2018-09-18 NOTE — PROGRESS NOTES
CHIEF COMPLAINT:   Patient presents with:  Cough: chest tightness and pressure, sinus pain, ear pain, sore throat x 4 days       HPI:   Yun Oliver is a 52year old female who presents for upper respiratory symptoms for  3-4 days.  Patient reports onset i OTHER SURGICAL HISTORY      Comment:  lysis of adhesions x 3   No date: REMOVAL GALLBLADDER      Social History    Tobacco Use      Smoking status: Current Every Day Smoker        Packs/day: 0.50        Quit date: 9/11/2015        Years since quitting: 3.0 for this Visit:  Requested Prescriptions     Signed Prescriptions Disp Refills   • amoxicillin 875 MG Oral Tab 20 tablet 0     Sig: Take 1 tablet (875 mg total) by mouth 2 (two) times daily for 10 days.    • Albuterol Sulfate HFA (PROAIR HFA) 108 (90 Base)

## 2018-09-27 RX ORDER — CYCLOBENZAPRINE HCL 10 MG
TABLET ORAL
Qty: 15 TABLET | Refills: 0 | Status: SHIPPED | OUTPATIENT
Start: 2018-09-27 | End: 2018-10-15

## 2018-09-27 NOTE — TELEPHONE ENCOUNTER
Last refilled on 8/30/18 for # 15 with 0 refills  Last seen on 9/21/17  No future appointments. Thank you.

## 2018-10-15 ENCOUNTER — MED REC SCAN ONLY (OUTPATIENT)
Dept: FAMILY MEDICINE CLINIC | Facility: CLINIC | Age: 48
End: 2018-10-15

## 2018-10-15 DIAGNOSIS — F98.8 ATTENTION DEFICIT DISORDER (ADD) WITHOUT HYPERACTIVITY: ICD-10-CM

## 2018-10-15 RX ORDER — CYCLOBENZAPRINE HCL 10 MG
TABLET ORAL
Qty: 15 TABLET | Refills: 0 | Status: SHIPPED | OUTPATIENT
Start: 2018-10-15 | End: 2018-11-08

## 2018-10-15 RX ORDER — DEXTROAMPHETAMINE SACCHARATE, AMPHETAMINE ASPARTATE, DEXTROAMPHETAMINE SULFATE AND AMPHETAMINE SULFATE 7.5; 7.5; 7.5; 7.5 MG/1; MG/1; MG/1; MG/1
30 TABLET ORAL DAILY
Qty: 30 TABLET | Refills: 0 | Status: SHIPPED | OUTPATIENT
Start: 2018-10-15 | End: 2018-11-30

## 2018-10-15 NOTE — TELEPHONE ENCOUNTER
Patient notified script is ready for . Placed in  folder along with form to donate plasma. Patient states she will be coming to  script herself.       Plasma form sent to scanning

## 2018-10-15 NOTE — TELEPHONE ENCOUNTER
Pt needs a refill of the   ADDERALL and   CYCLOBENZAPRINE HCL 10 MG Oral Tab    Please return call to [phone]   elaine in [place]

## 2018-10-15 NOTE — TELEPHONE ENCOUNTER
Adderall filled 8/30/18 330 with 0 refills  Cyclobenzaprine filled 9/27/18 #15 with 0 refills  Last OV  No future appointments.

## 2018-11-08 RX ORDER — CYCLOBENZAPRINE HCL 10 MG
TABLET ORAL
Qty: 15 TABLET | Refills: 0 | Status: SHIPPED | OUTPATIENT
Start: 2018-11-08 | End: 2018-11-30

## 2018-11-08 NOTE — TELEPHONE ENCOUNTER
Last refilled on 10/15/18 for # 15 with 0 refills  Last seen on 9/21/17  No future appointments. Thank you.

## 2018-11-16 ENCOUNTER — APPOINTMENT (OUTPATIENT)
Dept: GENERAL RADIOLOGY | Age: 48
End: 2018-11-16
Payer: COMMERCIAL

## 2018-11-16 ENCOUNTER — APPOINTMENT (OUTPATIENT)
Dept: GENERAL RADIOLOGY | Age: 48
End: 2018-11-16
Attending: PHYSICIAN ASSISTANT
Payer: COMMERCIAL

## 2018-11-16 ENCOUNTER — HOSPITAL ENCOUNTER (OUTPATIENT)
Age: 48
Discharge: HOME | End: 2018-11-16
Payer: COMMERCIAL

## 2018-11-16 VITALS
SYSTOLIC BLOOD PRESSURE: 120 MMHG | TEMPERATURE: 98 F | HEART RATE: 98 BPM | OXYGEN SATURATION: 99 % | DIASTOLIC BLOOD PRESSURE: 78 MMHG | RESPIRATION RATE: 20 BRPM

## 2018-11-16 DIAGNOSIS — M25.531 WRIST PAIN, ACUTE, RIGHT: Primary | ICD-10-CM

## 2018-11-16 DIAGNOSIS — M77.8 TENDONITIS OF WRIST, RIGHT: ICD-10-CM

## 2018-11-16 PROCEDURE — 99213 OFFICE O/P EST LOW 20 MIN: CPT

## 2018-11-16 PROCEDURE — 73090 X-RAY EXAM OF FOREARM: CPT

## 2018-11-16 PROCEDURE — 73110 X-RAY EXAM OF WRIST: CPT | Performed by: PHYSICIAN ASSISTANT

## 2018-11-16 PROCEDURE — 99214 OFFICE O/P EST MOD 30 MIN: CPT

## 2018-11-16 RX ORDER — METHYLPREDNISOLONE 4 MG/1
TABLET ORAL
Qty: 1 PACKAGE | Refills: 0 | Status: SHIPPED | OUTPATIENT
Start: 2018-11-16

## 2018-11-16 RX ORDER — DEXTROAMPHETAMINE SACCHARATE, AMPHETAMINE ASPARTATE MONOHYDRATE, DEXTROAMPHETAMINE SULFATE AND AMPHETAMINE SULFATE 2.5; 2.5; 2.5; 2.5 MG/1; MG/1; MG/1; MG/1
10 CAPSULE, EXTENDED RELEASE ORAL EVERY MORNING
COMMUNITY

## 2018-11-16 RX ORDER — NAPROXEN 500 MG/1
500 TABLET ORAL 2 TIMES DAILY PRN
Qty: 20 TABLET | Refills: 0 | Status: SHIPPED | OUTPATIENT
Start: 2018-11-16 | End: 2018-11-23

## 2018-11-16 RX ORDER — IBUPROFEN 600 MG/1
600 TABLET ORAL ONCE
Status: COMPLETED | OUTPATIENT
Start: 2018-11-16 | End: 2018-11-16

## 2018-11-16 NOTE — ED PROVIDER NOTES
Patient Seen in: [address] [facility]    History   No chief complaint on file.     Stated Complaint: arm pain    HPI    26-year-old female here with complaint of right forearm/wrist pain with swelling that occurred last night after moving furni (Temporal)   Resp 20   SpO2 99%   Breastfeeding? No         Physical Exam   Constitutional: She is oriented to person, place, and time. She appears well-developed and well-nourished. HENT:   Head: Normocephalic.    Right Ear: External ear normal.   Left E (CPT=73110)  TECHNIQUE:  Four views were obtained including dedicated navicular view. COMPARISON:  None.   INDICATIONS:  PATIENT STATED HISTORY: (As transcribed by Technologist)  Patient states she was carrying a heavy box yesterday and heard a pop in her 4 MG Oral Tablet Therapy Pack  Dosepack: take as directed  Qty: 1 Package Refills: 0

## 2018-11-30 DIAGNOSIS — G43.119 INTRACTABLE MIGRAINE WITH AURA WITHOUT STATUS MIGRAINOSUS: ICD-10-CM

## 2018-11-30 DIAGNOSIS — F98.8 ATTENTION DEFICIT DISORDER (ADD) WITHOUT HYPERACTIVITY: ICD-10-CM

## 2018-11-30 RX ORDER — DEXTROAMPHETAMINE SACCHARATE, AMPHETAMINE ASPARTATE, DEXTROAMPHETAMINE SULFATE AND AMPHETAMINE SULFATE 7.5; 7.5; 7.5; 7.5 MG/1; MG/1; MG/1; MG/1
30 TABLET ORAL DAILY
Qty: 30 TABLET | Refills: 0 | Status: SHIPPED | OUTPATIENT
Start: 2018-11-30 | End: 2018-12-30

## 2018-11-30 RX ORDER — SUMATRIPTAN 100 MG/1
TABLET, FILM COATED ORAL
Qty: 9 TABLET | Refills: 0 | Status: SHIPPED | OUTPATIENT
Start: 2018-11-30

## 2018-11-30 RX ORDER — CYCLOBENZAPRINE HCL 10 MG
TABLET ORAL
Qty: 15 TABLET | Refills: 0 | Status: SHIPPED | OUTPATIENT
Start: 2018-11-30

## 2018-11-30 NOTE — TELEPHONE ENCOUNTER
Patient notified and verbalized understanding. Also advised it has been over a year since last appt, will need appt before any more refills. Patient verbalized understanding.       Script placed in  folder

## 2018-11-30 NOTE — TELEPHONE ENCOUNTER
Last OV 9-  Last refilled:  10/15/18  Adderall  #30  0 refills  11/8/18 Cyclobenzaprine #15  0 refills  8/30/18  Sumatriptan  #9  0 refills

## 2018-11-30 NOTE — TELEPHONE ENCOUNTER
Pt called, needs refills on Refill-ADDERALL, CYCLOBENZAPRINE HCL 10 MG Oral Tab and SUMAtriptan Succinate 100 MG Oral Tab. Pt will  script on ADDERALL. Others go to [facility].   Please call pt at [phone]

## 2019-01-10 NOTE — TELEPHONE ENCOUNTER
Pt needs script for Adderal, 30 mgs. Pt's insurance was switched to [place] [employer]. Can KE give another refill?

## 2019-01-16 RX ORDER — DEXTROAMPHETAMINE SACCHARATE, AMPHETAMINE ASPARTATE MONOHYDRATE, DEXTROAMPHETAMINE SULFATE AND AMPHETAMINE SULFATE 2.5; 2.5; 2.5; 2.5 MG/1; MG/1; MG/1; MG/1
10 CAPSULE, EXTENDED RELEASE ORAL EVERY MORNING
Qty: 30 CAPSULE | Refills: 0 | Status: CANCELLED | OUTPATIENT
Start: 2019-01-16

## 2019-01-16 NOTE — TELEPHONE ENCOUNTER
Last refilled on 11/30/18 for # 30 with 0 refills  Last OV 9/21/17  No future appointments. Thank you.

## 2019-01-16 NOTE — TELEPHONE ENCOUNTER
Pt called asking if she can get a refill of her ADDERALL. She has [employer] and is trying to get it switched. She is unable to com in to see KE because of this.    Please return call to [phone]

## 2019-01-17 NOTE — TELEPHONE ENCOUNTER
It's now been almost a year and a half since I've seen her, I usually see pts every 6 months on this medication. She needs to be seen before more refills. It's a new year, she's had time to find new insurance.

## 2019-12-17 NOTE — TELEPHONE ENCOUNTER
Last OV 1/26/18  Last refilled 1/26/18  #9  0 refills
Patient notified sumatriptan sent to pharmacy
Pt called, needs refill on SUMAtriptan Succinate 100 MG Oral Tab. Pharmacy-[facility] [address] [place].    Please call pt at [phone]
Script sent.
Call Back UCX + ESBL

## 2021-01-28 ENCOUNTER — TELEPHONE (OUTPATIENT)
Dept: FAMILY MEDICINE CLINIC | Facility: CLINIC | Age: 51
End: 2021-01-28

## 2021-01-28 NOTE — TELEPHONE ENCOUNTER
Need to confirm PCP. Letter sent.   Pneumococcal Vaccine: Birth to 64yrs(1 of 1 - PPSV23) due on 11[birth year]17/1976  Pap Smear,3 Years due on 11/17/2001  Mammogram due on 11/17/2010  Annual Physical due on 05/24/2017  Tobacco Cessation Counseling 2 Years due on 05

## 2024-08-20 ENCOUNTER — OFFICE VISIT (OUTPATIENT)
Dept: FAMILY MEDICINE CLINIC | Facility: CLINIC | Age: 54
End: 2024-08-20
Payer: MEDICAID

## 2024-08-20 VITALS
SYSTOLIC BLOOD PRESSURE: 101 MMHG | TEMPERATURE: 98 F | BODY MASS INDEX: 21.6 KG/M2 | HEART RATE: 69 BPM | WEIGHT: 110 LBS | HEIGHT: 60 IN | OXYGEN SATURATION: 99 % | RESPIRATION RATE: 18 BRPM | DIASTOLIC BLOOD PRESSURE: 75 MMHG

## 2024-08-20 DIAGNOSIS — H66.002 LEFT ACUTE SUPPURATIVE OTITIS MEDIA: Primary | ICD-10-CM

## 2024-08-20 PROCEDURE — 99203 OFFICE O/P NEW LOW 30 MIN: CPT | Performed by: FAMILY MEDICINE

## 2024-08-20 NOTE — PROGRESS NOTES
Karely James is a 53 year old female.    S:  Patient presents today with the following concerns:  Chief Complaint   Patient presents with    Ear Pain     Left ear pain and sore throat . Started yesterday morning   OTC: vitamin c   NO exposure    Ear pain much worse the past couple of hours.     Current Outpatient Medications   Medication Sig Dispense Refill    amoxicillin clavulanate 875-125 MG Oral Tab Take 1 tablet by mouth 2 (two) times daily for 10 days. 20 tablet 0    SUMAtriptan Succinate 100 MG Oral Tab Take 1 tablet at onset of migraine; repeat once after 2 HRS-ONLY 2 IN 24 HR MAX 9 tablet 0    Cyclobenzaprine HCl 10 MG Oral Tab TAKE 1 TABLET BY MOUTH THREE TIMES DAILY AS NEEDED FOR MUSCLE SPASMS 15 tablet 0    Amphetamine-Dextroamphet ER 10 MG Oral Capsule SR 24 Hr Take 10 mg by mouth every morning.      methylPREDNISolone (MEDROL) 4 MG Oral Tablet Therapy Pack Dosepack: take as directed 1 Package 0    Pseudoephedrine-APAP-DM ( DAYQUIL OR) Take by mouth.      Albuterol Sulfate HFA (PROAIR HFA) 108 (90 Base) MCG/ACT Inhalation Aero Soln Inhale 2 puffs into the lungs every 4 (four) hours as needed for Wheezing or Shortness of Breath. 1 Inhaler 1    benzonatate 200 MG Oral Cap Take 1 capsule (200 mg total) by mouth 3 (three) times daily as needed for cough. 30 capsule 0     Patient Active Problem List   Diagnosis    Fibromyalgia    Migraine with aura and without status migrainosus, not intractable    Intra-abdominal adhesions    Pelvic pain    Ovarian cyst, right    Depression    Anxiety     Family History   Problem Relation Age of Onset    Diabetes Father     Cancer Father         skin    Diabetes Maternal Grandmother     Cancer Maternal Grandmother         lymphoma, breast    Diabetes Paternal Grandfather        REVIEW OF SYSTEMS:  GENERAL: feels well otherwise  SKIN: denies any unusual skin lesions  EYES:denies vision change  LUNGS: denies shortness of breath with exertion  CARDIOVASCULAR: denies chest  pain on exertion  GI: denies abdominal pain.  No N/V/D/C  : denies dysuria  MUSCULOSKELETAL: denies back pain  NEURO: denies headaches    EXAM:  /75 (BP Location: Right arm, Patient Position: Sitting, Cuff Size: adult)   Pulse 69   Temp 98.2 °F (36.8 °C) (Oral)   Resp 18   Ht 5' (1.524 m)   Wt 110 lb (49.9 kg)   SpO2 99%   BMI 21.48 kg/m²   Physical Exam  Constitutional:       General: She is not in acute distress.     Appearance: Normal appearance. She is not ill-appearing, toxic-appearing or diaphoretic.   HENT:      Head: Normocephalic and atraumatic.      Right Ear: Tympanic membrane, ear canal and external ear normal.      Left Ear: Ear canal and external ear normal.      Ears:      Comments: Left TM is bulging and erythematous     Nose: Nose normal.      Mouth/Throat:      Mouth: Mucous membranes are moist.      Pharynx: Oropharynx is clear.   Eyes:      Extraocular Movements: Extraocular movements intact.      Conjunctiva/sclera: Conjunctivae normal.      Pupils: Pupils are equal, round, and reactive to light.   Cardiovascular:      Rate and Rhythm: Normal rate and regular rhythm.      Heart sounds: Normal heart sounds.   Pulmonary:      Effort: Pulmonary effort is normal.      Breath sounds: Normal breath sounds.   Musculoskeletal:      Cervical back: Neck supple. No rigidity or tenderness.   Lymphadenopathy:      Cervical: No cervical adenopathy.   Skin:     General: Skin is warm and dry.   Neurological:      General: No focal deficit present.      Mental Status: She is alert and oriented to person, place, and time.   Psychiatric:         Mood and Affect: Mood normal.         Behavior: Behavior normal.        ASSESSMENT AND PLAN:  Karely James is a 53 year old female.  Encounter Diagnosis   Name Primary?    Left acute suppurative otitis media Yes       No results found.     No orders of the defined types were placed in this encounter.    Meds & Refills for this Visit:  Requested  Prescriptions     Signed Prescriptions Disp Refills    amoxicillin clavulanate 875-125 MG Oral Tab 20 tablet 0     Sig: Take 1 tablet by mouth 2 (two) times daily for 10 days.     Imaging & Consults:  None    Augmentin as above.  3 ibuprofen with 2 ES Tylenol every 6-8 hours prn pain.  Encouraged antihistamine such as Claritin and steroid nasal spray as well.      Follow up if symptoms change, worsen, do not improve.    Patient verbalizes understanding of plan.    No follow-ups on file.

## (undated) NOTE — MR AVS SNAPSHOT
[address] [facility]  [place] [address]  [phone]               Thank you for choosing us for your health care visit with Yariel Renee PA-C.   We are glad to serve you and happy to provide you with · If you develop a rash, hives, itching, throat tightness, or shortness of breath while on the antibiotic or shortly after completion, please call your PCP immediately and stop your antibiotic. This may be an allergic reaction.   If your physician's office Take 1 tablet (500 mg total) by mouth daily. Commonly known as:  LEVAQUIN           naproxen 500 MG Tabs   Take 1 tablet at onset of migraine along with Sumatriptan 100 mg.    Commonly known as:  NAPROSYN           SUMAtriptan Succinate 100 MG Tabs   Take

## (undated) NOTE — MR AVS SNAPSHOT
[address] [facility]  [place] [address]  [phone]               Thank you for choosing us for your health care visit with AYLA Dumont.   We are glad to serve you and happy to provide you with this · Heat may help soothe painful areas of the face. Use a towel soaked in hot water. Or,  the shower and direct the hot spray onto your face.  Using a vaporizer along with a menthol rub at night may also help.   · An expectorant containing guaifenesin · Fever of 100.4ºF (38ºC) or higher, or as directed by your healthcare provider  · Seizure  · Breathing problems  · Symptoms not resolving within 10 days  Date Last Reviewed: 4/13/2015  © 4602-6107 [address] visit,  view other health information, and more. To sign up or find more information, go to [email or web address] "[email or web address]. org and click on the Sign Up Now link in the [employer] box.      Enter your [employer] Activation Code exactly as it appears below along with yo

## (undated) NOTE — LETTER
[facility] IN [place]  [address] [id number]  Dept: [phone]  Dept Fax: [phone]      August 25, 2017    Patient: Yun Benito   Date of Visit: 8/25/2017       To Whom It May Concern:    Nelson Rodriguez was seen and treated in

## (undated) NOTE — Clinical Note
Date: 5/10/2017    Patient Name: Heather Bustillos          To Whom it may concern: This letter has been written at the patient's request. The above patient was seen at the [facility] for treatment of a medical condition.     This patient shoul

## (undated) NOTE — Clinical Note
Date: 5/8/2017    Patient Name: Cheryl Mccormack          To Whom it may concern: This letter has been written at the patient's request. The above patient was seen at the [facility] for treatment of a medical condition.     This patient should

## (undated) NOTE — Clinical Note
Date: 5/8/2017    Patient Name: Angeline Tobias          To Whom it may concern: This letter has been written at the patient's request. The above patient was seen at the [facility] for treatment of a medical condition.     This patient should

## (undated) NOTE — Clinical Note
05/16/2017        [place] [id number]      Dear Ry Yarbrough,    [address] [facility] records indicate that you have outstanding lab work and or testing that was ordered for you and has not yet been completed. To provide you with the best pos

## (undated) NOTE — MR AVS SNAPSHOT
[address] [facility]  [place] [address]  [phone]               Thank you for choosing us for your health care visit with Yariel Renee PA-C.   We are glad to serve you and happy to provide you with Follow up immediately (ER) if facial or body swelling occurs or you have difficulty breathing           Allergies as of May 10, 2017     Cymbalta [Duloxetine] Hallucinations, Other (See Comments)    hallucinations                Today's Vital Signs     BP Control Line Present with a clear background (yes/no) Yes Yes/No    Kit Lot # [id number] Numeric    Kit Expiration Date 10/31/2018 Date                  [employer]     Sign up for [employer], your secure online medical record.   M[employer] will allow you to access

## (undated) NOTE — LETTER
01/28/21        [address] [place] [id number]          Dear Patient,     According to our records you are due for an office visit. Regular visits are very important to your health.   If you are seeing a different primary care physic

## (undated) NOTE — Clinical Note
04/07/2017        [place] [address]      Dear [facility] records indicate that you have outstanding lab work and or testing that was ordered for you and has not yet been completed:   VITAMIN D  To provide yo

## (undated) NOTE — LETTER
09/05/17      Hui Wei CHETAN  [address] [place] [id number]        Dear Aisha Greene      To help us provide the highest quality medical care, [facility] uses a sophisticated computer system to track our patient records.  During

## (undated) NOTE — Clinical Note
Dear Phan Landeros,  It is with great pleasure that we were able to provide treatment to Dudley Rodriguez in the [facility] today. In order to keep you informed on your patient's care I have attached the visit history.    Our providers and staff here a

## (undated) NOTE — LETTER
Date & Time: 11/16/2018, 2:48 PM  Patient: Noah Carreno  Encounter Provider(s):    VANGIE Sheppard       To Whom It May Concern:    Mannie Joseph was seen and treated in our department on 11/16/2018.   Patient is injured her right wrist.  Patient will

## (undated) NOTE — Clinical Note
[address] [facility] [address]  [phone]        6/19/2017        Calli Ruiz  [facility] [address]      Dear Calli Ruiz,      To help us provid